# Patient Record
Sex: FEMALE | Race: WHITE | Employment: UNEMPLOYED | URBAN - METROPOLITAN AREA
[De-identification: names, ages, dates, MRNs, and addresses within clinical notes are randomized per-mention and may not be internally consistent; named-entity substitution may affect disease eponyms.]

---

## 2018-11-28 ENCOUNTER — HOSPITAL ENCOUNTER (OUTPATIENT)
Age: 23
Discharge: HOME | End: 2018-11-28
Attending: OBSTETRICS & GYNECOLOGY | Admitting: OBSTETRICS & GYNECOLOGY
Payer: MEDICAID

## 2018-11-28 VITALS
DIASTOLIC BLOOD PRESSURE: 65 MMHG | SYSTOLIC BLOOD PRESSURE: 123 MMHG | HEART RATE: 90 BPM | HEIGHT: 67 IN | TEMPERATURE: 96.6 F | RESPIRATION RATE: 18 BRPM

## 2018-11-28 PROCEDURE — 99211 OFF/OP EST MAY X REQ PHY/QHP: CPT

## 2019-10-07 LAB
ABO, EXTERNAL RESULT: NORMAL
HEP B, EXTERNAL RESULT: NEGATIVE
HIV, EXTERNAL RESULT: NON REACTIVE
RH FACTOR, EXTERNAL RESULT: POSITIVE
RPR, EXTERNAL RESULT: NON REACTIVE
RUBELLA TITER, EXTERNAL RESULT: NORMAL

## 2019-10-30 LAB
C. TRACHOMATIS, EXTERNAL RESULT: NEGATIVE
N. GONORRHOEAE, EXTERNAL RESULT: NEGATIVE

## 2020-04-15 LAB — GBS, EXTERNAL RESULT: NEGATIVE

## 2020-04-30 ENCOUNTER — ANESTHESIA EVENT (OUTPATIENT)
Dept: LABOR AND DELIVERY | Age: 25
DRG: 560 | End: 2020-04-30
Payer: COMMERCIAL

## 2020-04-30 ENCOUNTER — HOSPITAL ENCOUNTER (INPATIENT)
Age: 25
LOS: 2 days | Discharge: HOME | DRG: 560 | End: 2020-05-02
Attending: OBSTETRICS & GYNECOLOGY | Admitting: OBSTETRICS & GYNECOLOGY
Payer: COMMERCIAL

## 2020-04-30 ENCOUNTER — ANESTHESIA (OUTPATIENT)
Dept: LABOR AND DELIVERY | Age: 25
DRG: 560 | End: 2020-04-30
Payer: COMMERCIAL

## 2020-04-30 PROBLEM — Z78.9 ADMITTED TO LABOR AND DELIVERY: Status: ACTIVE | Noted: 2020-04-30

## 2020-04-30 PROBLEM — Z34.90 ENCOUNTER FOR INDUCTION OF LABOR: Status: ACTIVE | Noted: 2020-04-30

## 2020-04-30 LAB
ABO/RH: NORMAL
AMPHETAMINES: NEGATIVE
ANTIBODY SCREEN: NEGATIVE
BACTERIA: ABNORMAL /HPF
BARBITURATE SCREEN URINE: NEGATIVE
BENZODIAZEPINE SCREEN, URINE: NEGATIVE
BILIRUBIN URINE: NEGATIVE MG/DL
BLOOD, URINE: ABNORMAL
CANNABINOID SCREEN URINE: NEGATIVE
CLARITY: ABNORMAL
COCAINE METABOLITE: NEGATIVE
COLOR: ABNORMAL
DU ANTIGEN: POSITIVE
GLUCOSE, URINE: NEGATIVE MG/DL
HCT VFR BLD CALC: 34.5 % (ref 37–47)
HEMOGLOBIN: 10.6 GM/DL (ref 12.5–16)
KETONES, URINE: NEGATIVE MG/DL
LEUKOCYTE ESTERASE, URINE: ABNORMAL
MCH RBC QN AUTO: 24.5 PG (ref 27–31)
MCHC RBC AUTO-ENTMCNC: 30.7 % (ref 32–36)
MCV RBC AUTO: 79.9 FL (ref 78–100)
MUCUS: ABNORMAL HPF
NITRITE URINE, QUANTITATIVE: NEGATIVE
OPIATES, URINE: NEGATIVE
OXYCODONE: NEGATIVE
PDW BLD-RTO: 19.5 % (ref 11.7–14.9)
PH, URINE: 6 (ref 5–8)
PHENCYCLIDINE, URINE: NEGATIVE
PLATELET # BLD: 180 K/CU MM (ref 140–440)
PMV BLD AUTO: 10.4 FL (ref 7.5–11.1)
PROTEIN UA: 100 MG/DL
RBC # BLD: 4.32 M/CU MM (ref 4.2–5.4)
RBC URINE: 2 /HPF (ref 0–6)
SPECIFIC GRAVITY UA: 1.03 (ref 1–1.03)
SQUAMOUS EPITHELIAL: 16 /HPF
TRICHOMONAS: ABNORMAL /HPF
UROBILINOGEN, URINE: NORMAL MG/DL (ref 0.2–1)
WBC # BLD: 8.7 K/CU MM (ref 4–10.5)
WBC UA: 111 /HPF (ref 0–5)

## 2020-04-30 PROCEDURE — 86850 RBC ANTIBODY SCREEN: CPT

## 2020-04-30 PROCEDURE — 2500000003 HC RX 250 WO HCPCS: Performed by: NURSE ANESTHETIST, CERTIFIED REGISTERED

## 2020-04-30 PROCEDURE — 0KQM0ZZ REPAIR PERINEUM MUSCLE, OPEN APPROACH: ICD-10-PCS | Performed by: OBSTETRICS & GYNECOLOGY

## 2020-04-30 PROCEDURE — 80307 DRUG TEST PRSMV CHEM ANLYZR: CPT

## 2020-04-30 PROCEDURE — 1220000000 HC SEMI PRIVATE OB R&B

## 2020-04-30 PROCEDURE — 6360000002 HC RX W HCPCS

## 2020-04-30 PROCEDURE — 86900 BLOOD TYPING SEROLOGIC ABO: CPT

## 2020-04-30 PROCEDURE — 81001 URINALYSIS AUTO W/SCOPE: CPT

## 2020-04-30 PROCEDURE — 85027 COMPLETE CBC AUTOMATED: CPT

## 2020-04-30 PROCEDURE — 7200000001 HC VAGINAL DELIVERY

## 2020-04-30 PROCEDURE — 6360000002 HC RX W HCPCS: Performed by: NURSE ANESTHETIST, CERTIFIED REGISTERED

## 2020-04-30 PROCEDURE — 6370000000 HC RX 637 (ALT 250 FOR IP): Performed by: OBSTETRICS & GYNECOLOGY

## 2020-04-30 PROCEDURE — 3700000025 EPIDURAL BLOCK: Performed by: NURSE ANESTHETIST, CERTIFIED REGISTERED

## 2020-04-30 PROCEDURE — 6360000002 HC RX W HCPCS: Performed by: OBSTETRICS & GYNECOLOGY

## 2020-04-30 PROCEDURE — 86901 BLOOD TYPING SEROLOGIC RH(D): CPT

## 2020-04-30 PROCEDURE — 2580000003 HC RX 258: Performed by: OBSTETRICS & GYNECOLOGY

## 2020-04-30 PROCEDURE — 51702 INSERT TEMP BLADDER CATH: CPT

## 2020-04-30 RX ORDER — METHYLERGONOVINE MALEATE 0.2 MG/ML
INJECTION INTRAVENOUS
Status: COMPLETED
Start: 2020-04-30 | End: 2020-04-30

## 2020-04-30 RX ORDER — LANOLIN 100 %
OINTMENT (GRAM) TOPICAL PRN
Status: DISCONTINUED | OUTPATIENT
Start: 2020-04-30 | End: 2020-05-02 | Stop reason: HOSPADM

## 2020-04-30 RX ORDER — NALOXONE HYDROCHLORIDE 0.4 MG/ML
0.4 INJECTION, SOLUTION INTRAMUSCULAR; INTRAVENOUS; SUBCUTANEOUS PRN
Status: DISCONTINUED | OUTPATIENT
Start: 2020-04-30 | End: 2020-04-30 | Stop reason: HOSPADM

## 2020-04-30 RX ORDER — ONDANSETRON 4 MG/1
8 TABLET, ORALLY DISINTEGRATING ORAL EVERY 8 HOURS PRN
Status: DISCONTINUED | OUTPATIENT
Start: 2020-04-30 | End: 2020-05-02 | Stop reason: HOSPADM

## 2020-04-30 RX ORDER — ACETAMINOPHEN 325 MG/1
650 TABLET ORAL EVERY 4 HOURS PRN
Status: DISCONTINUED | OUTPATIENT
Start: 2020-04-30 | End: 2020-05-02 | Stop reason: HOSPADM

## 2020-04-30 RX ORDER — SODIUM CHLORIDE 0.9 % (FLUSH) 0.9 %
10 SYRINGE (ML) INJECTION EVERY 12 HOURS SCHEDULED
Status: DISCONTINUED | OUTPATIENT
Start: 2020-04-30 | End: 2020-05-02 | Stop reason: HOSPADM

## 2020-04-30 RX ORDER — HYDROCODONE BITARTRATE AND ACETAMINOPHEN 5; 325 MG/1; MG/1
1 TABLET ORAL EVERY 4 HOURS PRN
Status: DISCONTINUED | OUTPATIENT
Start: 2020-04-30 | End: 2020-05-02 | Stop reason: HOSPADM

## 2020-04-30 RX ORDER — LIDOCAINE HYDROCHLORIDE AND EPINEPHRINE 15; 5 MG/ML; UG/ML
INJECTION, SOLUTION EPIDURAL PRN
Status: DISCONTINUED | OUTPATIENT
Start: 2020-04-30 | End: 2020-04-30 | Stop reason: SDUPTHER

## 2020-04-30 RX ORDER — POLYETHYLENE GLYCOL 3350 17 G/17G
17 POWDER, FOR SOLUTION ORAL DAILY PRN
COMMUNITY

## 2020-04-30 RX ORDER — SIMETHICONE 80 MG
80 TABLET,CHEWABLE ORAL EVERY 6 HOURS PRN
Status: DISCONTINUED | OUTPATIENT
Start: 2020-04-30 | End: 2020-05-02 | Stop reason: HOSPADM

## 2020-04-30 RX ORDER — DOCUSATE SODIUM 100 MG/1
100 CAPSULE, LIQUID FILLED ORAL 2 TIMES DAILY
Status: DISCONTINUED | OUTPATIENT
Start: 2020-04-30 | End: 2020-05-02 | Stop reason: HOSPADM

## 2020-04-30 RX ORDER — SODIUM CHLORIDE 0.9 % (FLUSH) 0.9 %
10 SYRINGE (ML) INJECTION PRN
Status: DISCONTINUED | OUTPATIENT
Start: 2020-04-30 | End: 2020-05-02 | Stop reason: HOSPADM

## 2020-04-30 RX ORDER — IBUPROFEN 800 MG/1
800 TABLET ORAL EVERY 8 HOURS
Status: DISCONTINUED | OUTPATIENT
Start: 2020-04-30 | End: 2020-05-02 | Stop reason: HOSPADM

## 2020-04-30 RX ORDER — LIDOCAINE HYDROCHLORIDE 10 MG/ML
30 INJECTION, SOLUTION EPIDURAL; INFILTRATION; INTRACAUDAL; PERINEURAL PRN
Status: DISCONTINUED | OUTPATIENT
Start: 2020-04-30 | End: 2020-05-02 | Stop reason: HOSPADM

## 2020-04-30 RX ORDER — DOCUSATE SODIUM 100 MG/1
100 CAPSULE, LIQUID FILLED ORAL 2 TIMES DAILY
Status: DISCONTINUED | OUTPATIENT
Start: 2020-04-30 | End: 2020-04-30 | Stop reason: HOSPADM

## 2020-04-30 RX ORDER — ONDANSETRON 2 MG/ML
4 INJECTION INTRAMUSCULAR; INTRAVENOUS EVERY 6 HOURS PRN
Status: DISCONTINUED | OUTPATIENT
Start: 2020-04-30 | End: 2020-04-30 | Stop reason: HOSPADM

## 2020-04-30 RX ORDER — FERROUS SULFATE 325(65) MG
325 TABLET ORAL
COMMUNITY

## 2020-04-30 RX ORDER — BISACODYL 10 MG
10 SUPPOSITORY, RECTAL RECTAL DAILY PRN
Status: DISCONTINUED | OUTPATIENT
Start: 2020-04-30 | End: 2020-05-02 | Stop reason: HOSPADM

## 2020-04-30 RX ORDER — HYDROCODONE BITARTRATE AND ACETAMINOPHEN 5; 325 MG/1; MG/1
2 TABLET ORAL EVERY 4 HOURS PRN
Status: DISCONTINUED | OUTPATIENT
Start: 2020-04-30 | End: 2020-05-02 | Stop reason: HOSPADM

## 2020-04-30 RX ORDER — METHYLERGONOVINE MALEATE 0.2 MG/ML
200 INJECTION INTRAVENOUS PRN
Status: DISCONTINUED | OUTPATIENT
Start: 2020-04-30 | End: 2020-05-02 | Stop reason: HOSPADM

## 2020-04-30 RX ORDER — SODIUM CHLORIDE, SODIUM LACTATE, POTASSIUM CHLORIDE, CALCIUM CHLORIDE 600; 310; 30; 20 MG/100ML; MG/100ML; MG/100ML; MG/100ML
INJECTION, SOLUTION INTRAVENOUS CONTINUOUS
Status: DISCONTINUED | OUTPATIENT
Start: 2020-04-30 | End: 2020-05-02 | Stop reason: HOSPADM

## 2020-04-30 RX ORDER — ROPIVACAINE HYDROCHLORIDE 2 MG/ML
INJECTION, SOLUTION EPIDURAL; INFILTRATION; PERINEURAL PRN
Status: DISCONTINUED | OUTPATIENT
Start: 2020-04-30 | End: 2020-04-30 | Stop reason: SDUPTHER

## 2020-04-30 RX ORDER — METHYLERGONOVINE MALEATE 0.2 MG/ML
200 INJECTION INTRAVENOUS ONCE
Status: COMPLETED | OUTPATIENT
Start: 2020-04-30 | End: 2020-04-30

## 2020-04-30 RX ORDER — FENTANYL CITRATE 50 UG/ML
100 INJECTION, SOLUTION INTRAMUSCULAR; INTRAVENOUS
Status: DISCONTINUED | OUTPATIENT
Start: 2020-04-30 | End: 2020-04-30 | Stop reason: HOSPADM

## 2020-04-30 RX ORDER — ROPIVACAINE HYDROCHLORIDE 2 MG/ML
12 INJECTION, SOLUTION EPIDURAL; INFILTRATION; PERINEURAL CONTINUOUS
Status: DISCONTINUED | OUTPATIENT
Start: 2020-04-30 | End: 2020-05-02 | Stop reason: HOSPADM

## 2020-04-30 RX ORDER — FERROUS SULFATE 325(65) MG
325 TABLET ORAL 2 TIMES DAILY WITH MEALS
Status: DISCONTINUED | OUTPATIENT
Start: 2020-04-30 | End: 2020-05-02 | Stop reason: HOSPADM

## 2020-04-30 RX ADMIN — DOCUSATE SODIUM 100 MG: 100 CAPSULE, LIQUID FILLED ORAL at 20:36

## 2020-04-30 RX ADMIN — Medication 225 MILLI-UNITS/MIN: at 13:50

## 2020-04-30 RX ADMIN — ROPIVACAINE HYDROCHLORIDE 5 ML: 2 INJECTION, SOLUTION EPIDURAL; INFILTRATION at 11:00

## 2020-04-30 RX ADMIN — LIDOCAINE HYDROCHLORIDE,EPINEPHRINE BITARTRATE 3 ML: 15; .005 INJECTION, SOLUTION EPIDURAL; INFILTRATION; INTRACAUDAL; PERINEURAL at 10:57

## 2020-04-30 RX ADMIN — ROPIVACAINE HYDROCHLORIDE 12 ML/HR: 2 INJECTION, SOLUTION EPIDURAL; INFILTRATION at 11:03

## 2020-04-30 RX ADMIN — SODIUM CHLORIDE, POTASSIUM CHLORIDE, SODIUM LACTATE AND CALCIUM CHLORIDE: 600; 310; 30; 20 INJECTION, SOLUTION INTRAVENOUS at 10:30

## 2020-04-30 RX ADMIN — Medication 999 MILLI-UNITS/MIN: at 13:09

## 2020-04-30 RX ADMIN — METHYLERGONOVINE MALEATE 200 MCG: 0.2 INJECTION, SOLUTION INTRAMUSCULAR; INTRAVENOUS at 13:11

## 2020-04-30 RX ADMIN — IBUPROFEN 800 MG: 800 TABLET, FILM COATED ORAL at 17:31

## 2020-04-30 RX ADMIN — METHYLERGONOVINE MALEATE 200 MCG: 0.2 INJECTION INTRAVENOUS at 13:11

## 2020-04-30 RX ADMIN — SODIUM CHLORIDE, POTASSIUM CHLORIDE, SODIUM LACTATE AND CALCIUM CHLORIDE: 600; 310; 30; 20 INJECTION, SOLUTION INTRAVENOUS at 14:00

## 2020-04-30 RX ADMIN — ROPIVACAINE HYDROCHLORIDE 5 ML: 2 INJECTION, SOLUTION EPIDURAL; INFILTRATION at 11:03

## 2020-04-30 ASSESSMENT — PAIN DESCRIPTION - DESCRIPTORS: DESCRIPTORS: CRAMPING;TIGHTNESS

## 2020-04-30 ASSESSMENT — PAIN SCALES - GENERAL
PAINLEVEL_OUTOF10: 3
PAINLEVEL_OUTOF10: 0
PAINLEVEL_OUTOF10: 0

## 2020-04-30 ASSESSMENT — LIFESTYLE VARIABLES: SMOKING_STATUS: 0

## 2020-04-30 NOTE — L&D DELIVERY NOTE
Mother's Information    Labor Events     labor?:  No  Fluid color:  Bloody Show     Mother Delivery Information    Episiotomy:  None  Lacerations:  2nd  Surgical or Additional Est. Blood Loss (mL):  0 (View Only):  Edit in Flowsheets   Combined Est. Blood Loss (mL):  0        White, Baby Pending [place] [<[id number]>]    Labor Events     labor?:  No   steroids?:  None  Cervical ripening date/time:     Rupture date/time:     Fluid color:  Bloody Show  Labor complications:  None          Anesthesia    Method:  Epidural     Assisted Delivery Details    Forceps attempted?:  No  Vacuum extractor attempted?:  No     Document Additional Attempt       Document Additional Attempt             Shoulder Dystocia    Shoulder dystocia present?:  No  Add Second Maneuver  Add Third Maneuver  Add Fourth Maneuver  Add Fifth Maneuver  Add Sixth Maneuver  Add Seventh Maneuver  Add Eighth Maneuver  Add Ninth Maneuver     [place] Presentation    Presentation:  Vertex  Position:  Middle  _:  Occiput  _:  Anterior      Information     Changing the 's delivery date/time could affect patient care.:     Delivery date/time:       Delivery type:  Vaginal, Spontaneous    Details:         Delivery Providers    Delivering clinician:  Martin Vega MD     Placenta    Removal:  Spontaneous  Appearance:  Intact     [place] Measurements       Delivery Information    Episiotomy:  None  Perineal lacerations:  2nd Repaired:  Yes   Vaginal laceration:  No    Cervical laceration:  No    Surgical or additional est. blood loss (mL):  0 (View Only):  Edit in Flowsheets   Combined est. blood loss (mL):  0     Other Procedures    Procedures:  None          Department of Obstetrics and Gynecology  Spontaneous Vaginal Delivery Note         Pre-operative Diagnosis:  Term pregnancy, Spontaneous labor and Uncomplicated pregnancy    Post-operative Diagnosis:  Same + live male wt8 #,5 oz; Apg 8,8    Procedure:

## 2020-04-30 NOTE — ANESTHESIA PRE PROCEDURE
PREGSERUM, HCG, HCGQUANT     ABGs: No results found for: PHART, PO2ART, GKJ7TUC, IGF0WNG, BEART, R8VKNYFF     Type & Screen (If Applicable):  No results found for: LABABO, [address]    Anesthesia Evaluation  Patient summary reviewed and Nursing notes reviewed  Airway: Mallampati: I  TM distance: >3 FB   Neck ROM: full  Mouth opening: > = 3 FB Dental: normal exam         Pulmonary:Negative Pulmonary ROS breath sounds clear to auscultation      (-) not a current smoker                           Cardiovascular:Negative CV ROS  Exercise tolerance: good (>4 METS),           Rhythm: regular  Rate: normal           Beta Blocker:  Not on Beta Blocker         Neuro/Psych:   Negative Neuro/Psych ROS              GI/Hepatic/Renal: Neg GI/Hepatic/Renal ROS            Endo/Other: Negative Endo/Other ROS                    Abdominal:           Vascular: negative vascular ROS. Anesthesia Plan      epidural     ASA 2             Anesthetic plan and risks discussed with patient. Plan discussed with CRNA.                   Eliu Vega, MARTHA - CRNA   4/30/2020

## 2020-04-30 NOTE — PLAN OF CARE
Problem: Pain:  Description: Pain management should include both nonpharmacologic and pharmacologic interventions. Goal: Pain level will decrease  Description: Pain level will decrease  4/30/2020 1711 by Benja Fajardo RN  Outcome: Met This Shift  4/30/2020 1632 by David Keene RN  Outcome: Met This Shift  Goal: Control of acute pain  Description: Control of acute pain  4/30/2020 1632 by Neldon Foil. Jason Keene RN  Outcome: Met This Shift  Goal: Control of chronic pain  Description: Control of chronic pain  4/30/2020 1632 by Neldon Foil. Jason Keene RN  Outcome: Met This Shift     Problem: VAGINAL DELIVERY - RECOVERY AND POST PARTUM  Goal: Vital signs are medically acceptable  4/30/2020 1632 by Neldon Foil. Jason Keene RN  Outcome: Met This Shift  Goal: Patient will remain free of falls  4/30/2020 1632 by Neldon Foil. Jason Keene RN  Outcome: Met This Shift  Goal: Fundus firm at midline  4/30/2020 1632 by Neldon Foil. Jason Keene RN  Outcome: Met This Shift  Goal: Moderate rubra without clots, no purulent discharge, no foul smelling lochia  4/30/2020 1632 by Neldon Foil. Jason Keene RN  Outcome: Met This Shift  Goal: Empties bladder  4/30/2020 1632 by Neldon Foil. Jason Keene RN  Outcome: Met This Shift  Goal: Verbalizes understanding of normal bowel function resumption  4/30/2020 1632 by Neldon Foil. Jason Keene RN  Outcome: Met This Shift  Goal: Edema will be absent or minimal  4/30/2020 1632 by Neldon Foil. Jason Keene RN  Outcome: Met This Shift  Goal: Breasts are soft with nipple integrity intact  4/30/2020 1632 by Neldon Foil. Jason Keene RN  Outcome: Met This Shift  Goal: Demonstrates appropriate breast feeding techniques  4/30/2020 1632 by Neldon Foil. Jason Keene RN  Outcome: Met This Shift  Goal: Appropriate behavior observed  4/30/2020 1632 by Neldon Foil. Jason Keene RN  Outcome: Met This Shift  Goal: Positive Mother-Baby interactions are observed  4/30/2020 1632 by Neli Keene RN  Outcome: Met This Shift  Goal: Perineum intact without discharge or hematoma  4/30/2020 1632 by Sophia Denton. Sonali Diggs RN  Outcome: Met This Shift  Goal: Ambulates independently  4/30/2020 1632 by Sophia Denton. Sonali Diggs RN  Outcome: Met This Shift     Problem: KNOWLEDGE DEFICIT  Goal: Patient/S.O. demonstrates understanding of disease process, treatment plan, medications, and discharge instructions. 4/30/2020 1632 by Sophia Denton. Sonali Diggs RN  Outcome: Met This Shift     Problem: Breastfeeding - Ineffective:  Goal: Infant able to latch onto breast  Description: Infant able to latch onto breast  4/30/2020 1632 by Sophia Denton. Sonali Diggs RN  Outcome: Met This Shift  Goal: Intact skin on mother's nipple  Description: Intact skin on mother's nipple  4/30/2020 1632 by Sophia Denton. Sonali Diggs RN  Outcome: Met This Shift  Goal: Uninterrupted skin-to-skin contact during initial breast-feeding if medically possible  Description: Uninterrupted skin-to-skin contact during initial breast-feeding if medically possible  4/30/2020 1632 by Sophia Denton. Sonali Diggs RN  Outcome: Met This Shift  Goal: Urine and stool output as expected for age  Description: Urine and stool output as expected for age  4/30/2020 1632 by Sophia Denton. Sonali Diggs RN  Outcome: Met This Shift  Goal: Weight loss within specified parameters for age  Description: Weight loss within specified parameters for age  4/30/2020 1632 by Sophia Denton.  Sonali Diggs RN  Outcome: Met This Shift     Problem: Anxiety:  Goal: Level of anxiety will decrease  Description: Level of anxiety will decrease  Outcome: Met This Shift     Problem: Breathing Pattern - Ineffective:  Goal: Able to breathe comfortably  Description: Able to breathe comfortably  Outcome: Met This Shift     Problem: Fluid Volume - Imbalance:  Goal: Absence of imbalanced fluid volume signs and symptoms  Description: Absence of imbalanced fluid volume signs and symptoms  Outcome: Met This Shift  Goal: Absence of intrapartum hemorrhage signs and symptoms  Description: Absence of intrapartum hemorrhage signs and symptoms  Outcome: Met This Shift

## 2020-04-30 NOTE — FLOWSHEET NOTE
Pt to MB unit from L&D via wheel chair. Oriented to room. Pt oriented to phone, call light and tv. Pt instructed on visitation and smoking policies. Pt verbalizes understanding. Assessment completed. Fresh water pitcher given. Instructed pt to call nurse with number listed on white board if she needs anything. Encouraged pt to call nurse if she needs assistance with getting up or any other needs. Pt verbalizes understanding. Father of baby with pt.

## 2020-04-30 NOTE — H&P
Live Births   1               Family History History reviewed. No pertinent family history. PHYSICAL EXAM:  Fetal heart rate:       Heart Rate:  Cat 1 tracing          Contraction frequency: 3 minutes    Membranes:  Ruptured clear fluid    Cervix:         Dilation:  6 cm         Effacement:  75%         Station:  -3         Position:  posterior      Labs: CBC: admission labs pending    ASSESSMENT:  1. IUP 38w6d    2. Patient Active Problem List    Diagnosis Date Noted    Labor and delivery indication for care or intervention 04/30/2020    Admitted to labor and delivery 04/30/2020        PLAN:  1. Admit  2. gbs negative.   3. Plan NVD

## 2020-05-01 LAB
BASOPHILS ABSOLUTE: 0.1 K/CU MM
BASOPHILS RELATIVE PERCENT: 0.5 % (ref 0–1)
DIFFERENTIAL TYPE: ABNORMAL
EOSINOPHILS ABSOLUTE: 0.1 K/CU MM
EOSINOPHILS RELATIVE PERCENT: 0.8 % (ref 0–3)
HCT VFR BLD CALC: 29.7 % (ref 37–47)
HEMOGLOBIN: 8.9 GM/DL (ref 12.5–16)
IMMATURE NEUTROPHIL %: 0.8 % (ref 0–0.43)
LYMPHOCYTES ABSOLUTE: 1.8 K/CU MM
LYMPHOCYTES RELATIVE PERCENT: 15.9 % (ref 24–44)
MCH RBC QN AUTO: 24.5 PG (ref 27–31)
MCHC RBC AUTO-ENTMCNC: 30 % (ref 32–36)
MCV RBC AUTO: 81.6 FL (ref 78–100)
MONOCYTES ABSOLUTE: 0.8 K/CU MM
MONOCYTES RELATIVE PERCENT: 7 % (ref 0–4)
NUCLEATED RBC %: 0 %
PDW BLD-RTO: 19.2 % (ref 11.7–14.9)
PLATELET # BLD: 176 K/CU MM (ref 140–440)
PMV BLD AUTO: 9.9 FL (ref 7.5–11.1)
RBC # BLD: 3.64 M/CU MM (ref 4.2–5.4)
SEGMENTED NEUTROPHILS ABSOLUTE COUNT: 8.3 K/CU MM
SEGMENTED NEUTROPHILS RELATIVE PERCENT: 75 % (ref 36–66)
TOTAL IMMATURE NEUTOROPHIL: 0.09 K/CU MM
TOTAL NUCLEATED RBC: 0 K/CU MM
WBC # BLD: 11.1 K/CU MM (ref 4–10.5)

## 2020-05-01 PROCEDURE — 6370000000 HC RX 637 (ALT 250 FOR IP): Performed by: OBSTETRICS & GYNECOLOGY

## 2020-05-01 PROCEDURE — 85025 COMPLETE CBC W/AUTO DIFF WBC: CPT

## 2020-05-01 PROCEDURE — 1220000000 HC SEMI PRIVATE OB R&B

## 2020-05-01 RX ADMIN — IBUPROFEN 800 MG: 800 TABLET, FILM COATED ORAL at 18:43

## 2020-05-01 RX ADMIN — IBUPROFEN 800 MG: 800 TABLET, FILM COATED ORAL at 09:21

## 2020-05-01 RX ADMIN — DOCUSATE SODIUM 100 MG: 100 CAPSULE, LIQUID FILLED ORAL at 09:21

## 2020-05-01 RX ADMIN — DOCUSATE SODIUM 100 MG: 100 CAPSULE, LIQUID FILLED ORAL at 20:41

## 2020-05-01 RX ADMIN — FERROUS SULFATE TAB 325 MG (65 MG ELEMENTAL FE) 325 MG: 325 (65 FE) TAB at 18:42

## 2020-05-01 RX ADMIN — IBUPROFEN 800 MG: 800 TABLET, FILM COATED ORAL at 01:53

## 2020-05-01 ASSESSMENT — PAIN SCALES - GENERAL
PAINLEVEL_OUTOF10: 3

## 2020-05-01 NOTE — PLAN OF CARE
Violet Lam RN  Outcome: Ongoing  4/30/2020 1632 by Livia Matias. Ana Weber, RN  Outcome: Met This Shift  Goal: Weight loss within specified parameters for age  Description: Weight loss within specified parameters for age  4/30/2020 2300 by Schuyler Good RN  Outcome: Ongoing  4/30/2020 1632 by Livia Matias.  Ana Weber, RN  Outcome: Met This Shift

## 2020-05-01 NOTE — PROGRESS NOTES
Subjective:     Postpartum Day 1: Vaginal delivery    The patient feels well. The patient denies emotional concerns. Pain is well controlled with current medications. The baby iswell. Objective:      05/01/20 0559  98.4 (36.9)  16  68  115/64  --  --  --  --  --  --  --  --  --      05/01/20 0153  --  --  --  --  --  --  --  --  --  --  3  --  --     04/30/20 2050  98.4 (36.9)  16  86  133/74  --  --  --  --  --  --  --  --  --     04/30/20 2036  --  --  --  --  --  --  --  --  --  --  0  --  --     04/30/20 1910  --  --  --  --  --  --  --  --  --  --  0  --  --     04/30/20 1830  --  --  --  --  --  --  --  --  --  --  1  --  --     04/30/20 1731  --  --  --  --  --  --  --  --  --  --  3  --  --     04/30/20 15:50:52  98.3 (36.8)  16  97  118/76  --  Semi fowlers  --  --  100  None (Room air)  0  --  --     04/30/20 1505  98 (36.7)  18  80  109/67  --  Semi fowlers  --  --  --  --  --  --  --     04/30/20 1450  --  18  82  124/69  --  Semi fowlers  --  --  --  --  --  --  --     04/30/20 1435  --  18  85  116/68  --  Semi fowlers  --  --  --  --  --  --  --     04/30/20 1420  --  18  82  128/69  --  Semi fowlers  --  --  --  --  --  --  --     04/30/20 1405  --  18  82  124/67  --  Semi fowlers                       General:    alert, appears stated age and cooperative       Lochia:  appropriate   Uterine Fundus:   firm       DVT Evaluation:  No evidence of DVT seen on physical exam.     Assessment:     1. Post partum day 1 . Doing well. 2. EBL 450cc, hb 10.6 yesterday pending today    Plan:     Continue current care.

## 2020-05-01 NOTE — PLAN OF CARE
Problem: Pain:  Goal: Control of acute pain  Description: Control of acute pain  5/1/2020 0846 by José Antonio Aburto. Lesia Mahajan RN  Outcome: Met This Shift  4/30/2020 2300 by Khushi Pimentel RN  Outcome: Ongoing  Goal: Control of chronic pain  Description: Control of chronic pain  5/1/2020 0846 by José Antonio Aburto. Lesia Mahajan RN  Outcome: Met This Shift  4/30/2020 2300 by Khushi Pimentel RN  Outcome: Ongoing     Problem: VAGINAL DELIVERY - RECOVERY AND POST PARTUM  Goal: Vital signs are medically acceptable  5/1/2020 0846 by José Antonio Aburto. Lesia Mahajan RN  Outcome: Met This Shift  4/30/2020 2300 by Khushi Pimentel RN  Outcome: Ongoing  Goal: Patient will remain free of falls  5/1/2020 0846 by José Antonio Aburto. Lesia Mahajan RN  Outcome: Met This Shift  4/30/2020 2300 by Khushi Pimentel RN  Outcome: Ongoing  Goal: Fundus firm at midline  5/1/2020 0846 by José Antonio Aburto. Lesia Mahajan RN  Outcome: Met This Shift  4/30/2020 2300 by Khushi Pimnetel RN  Outcome: Ongoing  Goal: Moderate rubra without clots, no purulent discharge, no foul smelling lochia  5/1/2020 0846 by José Antonio Aburto. Lesia Mahajan RN  Outcome: Met This Shift  4/30/2020 2300 by Khushi Pimentel RN  Outcome: Ongoing  Goal: Empties bladder  5/1/2020 0846 by José Antonio Aburto. Lesia Mahajan RN  Outcome: Met This Shift  4/30/2020 2300 by Khushi Pimentel RN  Outcome: Ongoing  Goal: Verbalizes understanding of normal bowel function resumption  5/1/2020 0846 by José Antonio Aburto. Lesia Mahajan RN  Outcome: Met This Shift  4/30/2020 2300 by Khushi Pimentel RN  Outcome: Ongoing  Goal: Edema will be absent or minimal  5/1/2020 0846 by José Antonio Aburto. Lesia Mahajan RN  Outcome: Met This Shift  4/30/2020 2300 by Khushi Pimentel RN  Outcome: Ongoing  Goal: Breasts are soft with nipple integrity intact  5/1/2020 0846 by José Antonio Aburto. Lesia Mahajan RN  Outcome: Met This Shift  4/30/2020 2300 by Khushi Pimentel RN  Outcome: Ongoing  Goal: Demonstrates appropriate breast feeding techniques  5/1/2020 0846 by José Antonio Aburto.  Lesia Mahajan RN  Outcome: Met This Uterine contractions within specified parameters  Description: Uterine contractions within specified parameters  2020 by Brian Cobian RN  Outcome: Completed     Problem:  Screening:  Goal: Ability to make informed decisions regarding treatment has improved  Description: Ability to make informed decisions regarding treatment has improved  2020 by Brian Cobian RN  Outcome: Completed     Problem: Pain - Acute:  Goal: Pain level will decrease  Description: Pain level will decrease  2020 by Brian Cobian RN  Outcome: Completed  Goal: Able to cope with pain  Description: Able to cope with pain  2020 by Brian Cobian RN  Outcome: Completed     Problem: Tissue Perfusion - Uteroplacental, Altered:  Goal: Absence of abnormal fetal heart rate pattern  Description: Absence of abnormal fetal heart rate pattern  2020 by Brian Cobian RN  Outcome: Completed     Problem: Urinary Retention:  Goal: Experiences of bladder distention will decrease  Description: Experiences of bladder distention will decrease  2020 by Brian Cobian RN  Outcome: Completed  Goal: Urinary elimination within specified parameters  Description: Urinary elimination within specified parameters  2020 by Brian Cobian RN  Outcome: Completed

## 2020-05-02 VITALS
RESPIRATION RATE: 18 BRPM | TEMPERATURE: 97.8 F | HEART RATE: 90 BPM | SYSTOLIC BLOOD PRESSURE: 111 MMHG | DIASTOLIC BLOOD PRESSURE: 67 MMHG | BODY MASS INDEX: 37.83 KG/M2 | WEIGHT: 241 LBS | HEIGHT: 67 IN | OXYGEN SATURATION: 99 %

## 2020-05-02 PROCEDURE — 6370000000 HC RX 637 (ALT 250 FOR IP): Performed by: OBSTETRICS & GYNECOLOGY

## 2020-05-02 RX ORDER — IBUPROFEN 800 MG/1
800 TABLET ORAL EVERY 8 HOURS
Qty: 120 TABLET | Refills: 3 | Status: SHIPPED | OUTPATIENT
Start: 2020-05-02

## 2020-05-02 RX ORDER — HYDROCODONE BITARTRATE AND ACETAMINOPHEN 5; 325 MG/1; MG/1
1 TABLET ORAL EVERY 6 HOURS PRN
Qty: 8 TABLET | Refills: 0 | Status: SHIPPED | OUTPATIENT
Start: 2020-05-02 | End: 2020-05-05

## 2020-05-02 RX ADMIN — DOCUSATE SODIUM 100 MG: 100 CAPSULE, LIQUID FILLED ORAL at 10:00

## 2020-05-02 RX ADMIN — FERROUS SULFATE TAB 325 MG (65 MG ELEMENTAL FE) 325 MG: 325 (65 FE) TAB at 10:00

## 2020-05-02 RX ADMIN — IBUPROFEN 800 MG: 800 TABLET, FILM COATED ORAL at 04:10

## 2020-05-02 RX ADMIN — IBUPROFEN 800 MG: 800 TABLET, FILM COATED ORAL at 12:30

## 2020-05-02 ASSESSMENT — PAIN DESCRIPTION - PROGRESSION: CLINICAL_PROGRESSION: GRADUALLY WORSENING

## 2020-05-02 ASSESSMENT — PAIN DESCRIPTION - DESCRIPTORS: DESCRIPTORS: ACHING;CRAMPING

## 2020-05-02 ASSESSMENT — PAIN DESCRIPTION - ONSET: ONSET: GRADUAL

## 2020-05-02 ASSESSMENT — PAIN DESCRIPTION - ORIENTATION: ORIENTATION: LOWER

## 2020-05-02 ASSESSMENT — PAIN SCALES - GENERAL
PAINLEVEL_OUTOF10: 3
PAINLEVEL_OUTOF10: 3

## 2020-05-02 ASSESSMENT — PAIN - FUNCTIONAL ASSESSMENT: PAIN_FUNCTIONAL_ASSESSMENT: ACTIVITIES ARE NOT PREVENTED

## 2020-05-02 ASSESSMENT — PAIN DESCRIPTION - LOCATION: LOCATION: ABDOMEN

## 2020-05-02 ASSESSMENT — PAIN DESCRIPTION - FREQUENCY: FREQUENCY: CONTINUOUS

## 2020-05-02 NOTE — DISCHARGE SUMMARY
Obstetrical Discharge Form    Gestational Age:  38w7d    Antepartum complications: none    Date of Delivery:    20    Type of Delivery:   vaginal, spontaneous    Delivered By:                 Kojo Luciano:       Information for the patient's :  ClintonClaudia Hernandez [[id number]]        Anesthesia:    Epidural    Intrapartum complications: None, excessive ebl at 450cc, pp hb 8.9    Postpartum complications: none    Condition at discharge: Good/stable      Discharge Date:  20     Plan:   Follow up in 6 weeks.

## 2021-03-31 ENCOUNTER — OFFICE VISIT (OUTPATIENT)
Dept: PHYSICAL MEDICINE AND REHAB | Age: 26
End: 2021-03-31
Payer: COMMERCIAL

## 2021-03-31 VITALS — TEMPERATURE: 97.1 F

## 2021-03-31 DIAGNOSIS — M25.552 HIP PAIN, BILATERAL: ICD-10-CM

## 2021-03-31 DIAGNOSIS — M25.551 HIP PAIN, BILATERAL: ICD-10-CM

## 2021-03-31 DIAGNOSIS — G57.13 MERALGIA PARESTHETICA OF BOTH LOWER EXTREMITIES: Primary | ICD-10-CM

## 2021-03-31 DIAGNOSIS — R20.2 PARESTHESIA OF BILATERAL LEGS: ICD-10-CM

## 2021-03-31 PROCEDURE — 95911 NRV CNDJ TEST 9-10 STUDIES: CPT | Performed by: PHYSICAL MEDICINE & REHABILITATION

## 2021-03-31 PROCEDURE — 95886 MUSC TEST DONE W/N TEST COMP: CPT | Performed by: PHYSICAL MEDICINE & REHABILITATION

## 2021-03-31 NOTE — LETTER
March 31, 2021        Orion Novak MD  [address] [place] [address]         Patient Name: Talya Sapp   MRN Number: [id number]   YOB: 1995   Date Of Visit: 03/31/2021          Dear Orion Novak MD,      Thank you for referring Talya Sapp to me for electrodiagnostic testing. Attached are the findings of the EMG and my assessment. If you have any further questions, please do not hesitate to call me. Thank you for this interesting referral.      Sincerely,          LAURO Bob MD.

## 2021-03-31 NOTE — PROGRESS NOTES
EMG REPORT     CHIEF COMPLAINT: Pain and numbness in the pelvis and upper thighs. HISTORY OF PRESENT ILLNESS: 31 y.o. right hand dominant female with several years of intermittent upper leg/pelvic pain with numbness in the outer aspects of each hip. Her symptoms sometimes seem activity dependent, but not always. She has not noted any rashes, limb discoloration or cramping. She does not routinely have back pain. The numbness stays localized, but her pains occasionally radiate into the lower legs but generally not into her feet. She rated the pain severity is 78. Generally her sleep is unaffected. She does not feel like she has been tripping more than normal.  She has no history of diabetes or any thyroid disorder. PHYSICAL EXAMINATION: Alert. Normal lumbar lordosis. Normal hip and knee passive range of motion. No inguinal adenopathy noted. Negative straight leg raise maneuver. 2+ and symmetric knee jerks. 1+ and symmetric ankle jerks. No focal weakness. Normal muscle bulk. No tremor or clonus. Sensation seemed intact. NERVE CONDUCTION STUDIES:     MOTOR         LATENCY NORMAL AMPLITUDE DISTANCE COND. TORSTEN. R  PERONEAL   3.7 < 6.2 msec 7.6 8 cm 53   L  PERONEAL  < 6.2 msec  8 cm    RIGHT  TIBIAL 3.2 < 6.2 msec 0.6 8 cm 52   LEFT TIBIAL  < 6.2 msec  8 cm    R TIB H REFLEX 26.2 < 50 msec      L TIB H REFLEX 26.2 < 50 msec         SENSORY  ANTIDROMIC        LATENCY NORMAL AMPLITUDE DISTANCE   R SUP PERONEAL 2.6 < 3.6 msec 14 10 cm   L SUP PERONEAL  < 3.6 msec  10 cm   RIGHT  SURAL 3.7 < 4.0 msec 8 14 cm   LEFT  SURAL 3.9 < 4.0 msec 6 14 cm       Right lateral femoral cutaneous sensory: Absent. Left lateral femoral cutaneous sensory: Absent.       NEEDLE EMG:      RIGHT   LEFT     Insertional Activity Spontaneous  Activity Volutional  MUAP's Insertional Activity Spontaneous  Activity Volutional  MUAP's   Lumbar paraspinals Normal None Normal Normal None Normal   Glut Med Normal None Normal Normal None Normal   Rect fem Normal None Normal Normal None Normal   Adduct group Normal None Normal      Vast Med Normal None Normal Normal None Normal   Ant Tibialis Normal None Normal Normal None Normal   EHL Normal None Normal Normal None Normal   FDL Normal None Normal Normal None Normal   Gastroc Normal None Normal Normal None Normal   EDB Normal None Normal Normal None Normal   1st dors int Normal None Normal Normal None Normal       FINDINGS:   EMG of the lumbar paraspinals and both lower limbs demonstrated no paraspinal nor limb muscle membrane irritability. Motor units were of normal configuration and recruitment throughout. The right tibial motor evoked amplitude was less than expected. I was unable to record either lateral femoral cutaneous sensory response. All other sensory and motor latencies, evoked amplitudes and conduction velocities were normal.  Tibial H reflexes were symmetric. IMPRESSION:      1. Minimally abnormal EMG. The diminished tibial motor evoked amplitude by itself is nondiagnostic. It may have simply been a product of testing technique. I do not believe it is related to her presentation. 2.  The absence of the lateral femoral cutaneous sensory responses is a common finding, but may be clinically relevant. Her clinical presentation is not classic for meralgia paresthetica, but that could be a contributor to her symptoms. 3.  No evidence of any focal lumbar spinal nerve root injury (radiculopathy) or plexopathy. 4.  No evidence of a generalized neuropathy or primary muscle disease.         Thank you for this interesting referral.